# Patient Record
Sex: MALE | Race: WHITE | NOT HISPANIC OR LATINO | Employment: FULL TIME | URBAN - METROPOLITAN AREA
[De-identification: names, ages, dates, MRNs, and addresses within clinical notes are randomized per-mention and may not be internally consistent; named-entity substitution may affect disease eponyms.]

---

## 2022-08-31 ENCOUNTER — OFFICE VISIT (OUTPATIENT)
Dept: INTERNAL MEDICINE | Facility: CLINIC | Age: 32
End: 2022-08-31

## 2022-08-31 VITALS
BODY MASS INDEX: 27.7 KG/M2 | OXYGEN SATURATION: 98 % | SYSTOLIC BLOOD PRESSURE: 140 MMHG | HEART RATE: 116 BPM | DIASTOLIC BLOOD PRESSURE: 80 MMHG | RESPIRATION RATE: 16 BRPM | TEMPERATURE: 100.2 F | HEIGHT: 69 IN | WEIGHT: 187 LBS

## 2022-08-31 DIAGNOSIS — I10 PRIMARY HYPERTENSION: ICD-10-CM

## 2022-08-31 DIAGNOSIS — Z00.00 ROUTINE ADULT HEALTH MAINTENANCE: Primary | ICD-10-CM

## 2022-08-31 PROCEDURE — 99204 OFFICE O/P NEW MOD 45 MIN: CPT | Performed by: NURSE PRACTITIONER

## 2022-08-31 RX ORDER — DEXTROAMPHETAMINE SACCHARATE, AMPHETAMINE ASPARTATE, DEXTROAMPHETAMINE SULFATE AND AMPHETAMINE SULFATE 1.25; 1.25; 1.25; 1.25 MG/1; MG/1; MG/1; MG/1
TABLET ORAL
COMMUNITY
Start: 2022-08-17 | End: 2022-09-28

## 2022-08-31 RX ORDER — METOPROLOL SUCCINATE 50 MG/1
50 TABLET, EXTENDED RELEASE ORAL DAILY
Qty: 30 TABLET | Refills: 1 | Status: SHIPPED | OUTPATIENT
Start: 2022-08-31 | End: 2022-09-28 | Stop reason: SDUPTHER

## 2022-08-31 NOTE — ASSESSMENT & PLAN NOTE
Hypertension is worsening.  Continue current treatment regimen.  Dietary sodium restriction.  Weight loss.  Regular aerobic exercise.  Continue current medications.  Blood pressure will be reassessed in 2-4 weeks.   Patient advised to check BP daily.

## 2022-08-31 NOTE — PROGRESS NOTES
"Chief Complaint  Establish Care (Med refill)    Subjective        Joaquim Solano presents to [facility]  History of Present Illness  This is a 32 y/o male presenting to office for establishment of care. Patient reports last check up 2 months ago. Patient reports he is currently  and has no children. Patient reports working as a .     Patient reports some exercise. Patient reports following healthy diet.     Patient reports weening off of vaping at this time. Patient reports using occasional alcohol.     Patient follows with Dr. Rao for psychiatric needs. Patient reports hx of ADHD-- restarted on adderall back on 8/17/22. Patient continues following with therapist-- Caitlin Milan.     Patient reports having some issues with high blood pressure. Patient reports his BP was elevated at his last appt with psychiatrist. Patient reports previous hx with HTN-- had taken lisinopril but had cough. Patient denies chest pain.     Objective   Vital Signs:  /80 (BP Location: Left arm, Patient Position: Sitting, Cuff Size: Large Adult)   Pulse 116   Temp 100.2 °F (37.9 °C) (Temporal)   Resp 16   Ht 175.3 cm (69\")   Wt 84.8 kg (187 lb)   SpO2 98%   BMI 27.62 kg/m²   Estimated body mass index is 27.62 kg/m² as calculated from the following:    Height as of this encounter: 175.3 cm (69\").    Weight as of this encounter: 84.8 kg (187 lb).          Physical Exam  Constitutional:       Appearance: Normal appearance.   HENT:      Head: Normocephalic and atraumatic.   Eyes:      Extraocular Movements: Extraocular movements intact.      Pupils: Pupils are equal, round, and reactive to light.   Cardiovascular:      Rate and Rhythm: Normal rate and regular rhythm.      Pulses: Normal pulses.      Heart sounds: Normal heart sounds. No murmur heard.    No friction rub. No gallop.   Pulmonary:      Effort: Pulmonary effort is normal. No respiratory distress.      Breath " sounds: No stridor. No wheezing, rhonchi or rales.   Musculoskeletal:         General: Normal range of motion.   Skin:     General: Skin is warm.   Neurological:      General: No focal deficit present.      Mental Status: He is alert and oriented to person, place, and time. Mental status is at baseline.   Psychiatric:         Mood and Affect: Mood normal.         Thought Content: Thought content normal.         Judgment: Judgment normal.        Result Review :                Assessment and Plan   Diagnoses and all orders for this visit:    1. Routine adult health maintenance (Primary)  Assessment & Plan:  Return in 4 weeks for fasting blood panel.   Continue with regular exercise and DASH diet recommendations.         2. Primary hypertension  Assessment & Plan:  Hypertension is worsening.  Continue current treatment regimen.  Dietary sodium restriction.  Weight loss.  Regular aerobic exercise.  Continue current medications.  Blood pressure will be reassessed in 2-4 weeks.   Patient advised to check BP daily.     Orders:  -     metoprolol succinate XL (Toprol XL) 50 MG 24 hr tablet; Take 1 tablet by mouth Daily for 30 days.  Dispense: 30 tablet; Refill: 1         I spent 30 minutes caring for Joaquim on this date of service. This time includes time spent by me in the following activities:preparing for the visit, reviewing tests, obtaining and/or reviewing a separately obtained history, performing a medically appropriate examination and/or evaluation , counseling and educating the patient/family/caregiver, ordering medications, tests, or procedures, documenting information in the medical record and care coordination  Follow Up   Return in about 4 weeks (around 9/28/2022) for Recheck HTN and fasting labs.  Patient was given instructions and counseling regarding his condition or for health maintenance advice. Please see specific information pulled into the AVS if appropriate.

## 2022-08-31 NOTE — ASSESSMENT & PLAN NOTE
Return in 4 weeks for fasting blood panel.   Continue with regular exercise and DASH diet recommendations.

## 2022-09-01 ENCOUNTER — PATIENT MESSAGE (OUTPATIENT)
Dept: INTERNAL MEDICINE | Facility: CLINIC | Age: 32
End: 2022-09-01

## 2022-09-02 NOTE — TELEPHONE ENCOUNTER
From: Joaquim Solano  To: IRIS Nieves  Sent: 9/1/2022 3:41 PM EDT  Subject: Blood Pressure    Lynn,    Thank you for meeting with me. I'm happy to have found a professional and pleasant primary care specialist. Speaking with Dr. Rao he noted he'd like a letter stating my blood pressure was under manageable control since when I was in his office last week it was spiked to 190/120 (which I thought was a bit of a misreading or was misread). This also caused him to rethink the stimulant (for now), but during my first reading at [facility] (140/80) and second reading (150/90), it was no where near what I showed in Dr. MARINELLI's office. Is it too soon to ask for a letter stating my blood pressure is more under control then what was shown in his office that day?    Thank you,  Joaquim Solano

## 2022-09-28 ENCOUNTER — OFFICE VISIT (OUTPATIENT)
Dept: INTERNAL MEDICINE | Facility: CLINIC | Age: 32
End: 2022-09-28

## 2022-09-28 VITALS
SYSTOLIC BLOOD PRESSURE: 170 MMHG | BODY MASS INDEX: 28.58 KG/M2 | TEMPERATURE: 98 F | WEIGHT: 193 LBS | OXYGEN SATURATION: 99 % | HEIGHT: 69 IN | DIASTOLIC BLOOD PRESSURE: 100 MMHG | HEART RATE: 93 BPM | RESPIRATION RATE: 18 BRPM

## 2022-09-28 DIAGNOSIS — I10 PRIMARY HYPERTENSION: ICD-10-CM

## 2022-09-28 DIAGNOSIS — Z00.00 ROUTINE ADULT HEALTH MAINTENANCE: Primary | ICD-10-CM

## 2022-09-28 PROCEDURE — 99213 OFFICE O/P EST LOW 20 MIN: CPT | Performed by: NURSE PRACTITIONER

## 2022-09-28 RX ORDER — DEXTROAMPHETAMINE SACCHARATE, AMPHETAMINE ASPARTATE, DEXTROAMPHETAMINE SULFATE AND AMPHETAMINE SULFATE 2.5; 2.5; 2.5; 2.5 MG/1; MG/1; MG/1; MG/1
TABLET ORAL
COMMUNITY
Start: 2022-09-07 | End: 2023-03-28

## 2022-09-28 RX ORDER — METOPROLOL SUCCINATE 50 MG/1
50 TABLET, EXTENDED RELEASE ORAL DAILY
Qty: 90 TABLET | Refills: 0 | Status: SHIPPED | OUTPATIENT
Start: 2022-09-28 | End: 2023-03-28 | Stop reason: SDUPTHER

## 2022-09-28 NOTE — PROGRESS NOTES
"Chief Complaint  Follow-up and Hypertension (Pt hasn't taking medicine in 3 days haven't ate today and had an energy drink and coffee this morning )    Subjective        Joaquim Solano presents to [facility]  History of Present Illness  This is a 32 y/o male presenting to office for f/u with HTN. Patient reports he has not taken any of his metoprolol the past 3 days. Patient reports that previously he had been checking his BP at home on metoprolol-- average readings 120/80's. Denies any current chest pain or heart palpitations. Patient is also here to get lab work completed.     Objective   Vital Signs:  /100 (BP Location: Left arm, Patient Position: Sitting, Cuff Size: Adult)   Pulse 93   Temp 98 °F (36.7 °C)   Resp 18   Ht 175.3 cm (69\")   Wt 87.5 kg (193 lb)   SpO2 99%   BMI 28.50 kg/m²   Estimated body mass index is 28.5 kg/m² as calculated from the following:    Height as of this encounter: 175.3 cm (69\").    Weight as of this encounter: 87.5 kg (193 lb).          Physical Exam   Result Review :  The following data was reviewed by: IRIS Nieves on 09/28/2022:             Assessment and Plan   Diagnoses and all orders for this visit:    1. Routine adult health maintenance (Primary)  -     CBC & Differential  -     Comprehensive metabolic panel  -     TSH Rfx On Abnormal To Free T4  -     Hemoglobin A1c  -     Lipid panel    2. Primary hypertension  Assessment & Plan:  Advised to take medication once he goes home.   Advised to send me BP readings for the week.   Advised to make sure to take blood pressure medication daily.       Orders:  -     metoprolol succinate XL (Toprol XL) 50 MG 24 hr tablet; Take 1 tablet by mouth Daily for 30 days.  Dispense: 90 tablet; Refill: 0  -     CBC & Differential  -     Comprehensive metabolic panel           Follow Up   Return in about 6 months (around 3/28/2023) for Recheck HTN.  Patient was given instructions and counseling " regarding his condition or for health maintenance advice. Please see specific information pulled into the AVS if appropriate.

## 2022-09-28 NOTE — ASSESSMENT & PLAN NOTE
Advised to take medication once he goes home.   Advised to send me BP readings for the week.   Advised to make sure to take blood pressure medication daily.

## 2022-09-29 LAB
ALBUMIN SERPL-MCNC: 4.9 G/DL (ref 3.5–5.2)
ALBUMIN/GLOB SERPL: 2 G/DL
ALP SERPL-CCNC: 73 U/L (ref 39–117)
ALT SERPL-CCNC: 31 U/L (ref 1–41)
AST SERPL-CCNC: 22 U/L (ref 1–40)
BASOPHILS # BLD AUTO: 0.04 10*3/MM3 (ref 0–0.2)
BASOPHILS NFR BLD AUTO: 0.4 % (ref 0–1.5)
BILIRUB SERPL-MCNC: 0.6 MG/DL (ref 0–1.2)
BUN SERPL-MCNC: 13 MG/DL (ref 6–20)
BUN/CREAT SERPL: 14.6 (ref 7–25)
CALCIUM SERPL-MCNC: 10 MG/DL (ref 8.6–10.5)
CHLORIDE SERPL-SCNC: 98 MMOL/L (ref 98–107)
CHOLEST SERPL-MCNC: 203 MG/DL (ref 0–200)
CO2 SERPL-SCNC: 27 MMOL/L (ref 22–29)
CREAT SERPL-MCNC: 0.89 MG/DL (ref 0.76–1.27)
EGFRCR SERPLBLD CKD-EPI 2021: 116 ML/MIN/1.73
EOSINOPHIL # BLD AUTO: 0.11 10*3/MM3 (ref 0–0.4)
EOSINOPHIL NFR BLD AUTO: 1 % (ref 0.3–6.2)
ERYTHROCYTE [DISTWIDTH] IN BLOOD BY AUTOMATED COUNT: 12.4 % (ref 12.3–15.4)
GLOBULIN SER CALC-MCNC: 2.4 GM/DL
GLUCOSE SERPL-MCNC: 97 MG/DL (ref 65–99)
HBA1C MFR BLD: 5.2 % (ref 4.8–5.6)
HCT VFR BLD AUTO: 45.2 % (ref 37.5–51)
HDLC SERPL-MCNC: 84 MG/DL (ref 40–60)
HGB BLD-MCNC: 15.8 G/DL (ref 13–17.7)
IMM GRANULOCYTES # BLD AUTO: 0.06 10*3/MM3 (ref 0–0.05)
IMM GRANULOCYTES NFR BLD AUTO: 0.6 % (ref 0–0.5)
LDLC SERPL CALC-MCNC: 107 MG/DL (ref 0–100)
LYMPHOCYTES # BLD AUTO: 2.91 10*3/MM3 (ref 0.7–3.1)
LYMPHOCYTES NFR BLD AUTO: 27.5 % (ref 19.6–45.3)
MCH RBC QN AUTO: 32.8 PG (ref 26.6–33)
MCHC RBC AUTO-ENTMCNC: 35 G/DL (ref 31.5–35.7)
MCV RBC AUTO: 94 FL (ref 79–97)
MONOCYTES # BLD AUTO: 0.69 10*3/MM3 (ref 0.1–0.9)
MONOCYTES NFR BLD AUTO: 6.5 % (ref 5–12)
NEUTROPHILS # BLD AUTO: 6.79 10*3/MM3 (ref 1.7–7)
NEUTROPHILS NFR BLD AUTO: 64 % (ref 42.7–76)
NRBC BLD AUTO-RTO: 0 /100 WBC (ref 0–0.2)
PLATELET # BLD AUTO: 217 10*3/MM3 (ref 140–450)
POTASSIUM SERPL-SCNC: 4.2 MMOL/L (ref 3.5–5.2)
PROT SERPL-MCNC: 7.3 G/DL (ref 6–8.5)
RBC # BLD AUTO: 4.81 10*6/MM3 (ref 4.14–5.8)
SODIUM SERPL-SCNC: 138 MMOL/L (ref 136–145)
TRIGL SERPL-MCNC: 64 MG/DL (ref 0–150)
TSH SERPL DL<=0.005 MIU/L-ACNC: 1.26 UIU/ML (ref 0.27–4.2)
VLDLC SERPL CALC-MCNC: 12 MG/DL (ref 5–40)
WBC # BLD AUTO: 10.6 10*3/MM3 (ref 3.4–10.8)

## 2022-09-29 NOTE — PROGRESS NOTES
Please let patient know-- labs stable; LDL slightly elevated; recommend low refined carb/low sat fat diet and exercise.     F/u as scheduled.

## 2023-03-28 ENCOUNTER — OFFICE VISIT (OUTPATIENT)
Dept: INTERNAL MEDICINE | Facility: CLINIC | Age: 33
End: 2023-03-28
Payer: COMMERCIAL

## 2023-03-28 VITALS
DIASTOLIC BLOOD PRESSURE: 80 MMHG | BODY MASS INDEX: 33.03 KG/M2 | WEIGHT: 223 LBS | HEART RATE: 113 BPM | OXYGEN SATURATION: 99 % | TEMPERATURE: 98.7 F | HEIGHT: 69 IN | SYSTOLIC BLOOD PRESSURE: 138 MMHG

## 2023-03-28 DIAGNOSIS — S49.92XA INJURY OF LEFT SHOULDER, INITIAL ENCOUNTER: Chronic | ICD-10-CM

## 2023-03-28 DIAGNOSIS — M62.838 MUSCLE SPASM OF RIGHT LEG: ICD-10-CM

## 2023-03-28 DIAGNOSIS — I10 PRIMARY HYPERTENSION: Primary | ICD-10-CM

## 2023-03-28 RX ORDER — METOPROLOL SUCCINATE 50 MG/1
50 TABLET, EXTENDED RELEASE ORAL DAILY
Qty: 90 TABLET | Refills: 2 | Status: SHIPPED | OUTPATIENT
Start: 2023-03-28 | End: 2023-04-27

## 2023-03-28 RX ORDER — DEXTROAMPHETAMINE SACCHARATE, AMPHETAMINE ASPARTATE, DEXTROAMPHETAMINE SULFATE AND AMPHETAMINE SULFATE 3.75; 3.75; 3.75; 3.75 MG/1; MG/1; MG/1; MG/1
TABLET ORAL
COMMUNITY
Start: 2023-03-13

## 2023-03-28 NOTE — PROGRESS NOTES
"Chief Complaint  Hypertension (recheck)    Subjective        Joaquim Solano presents to [facility]  History of Present Illness  This is a 31 y/o male presenting to office for f/u with HTN. Patient has been out of metoprolol over the past few weeks. Reports checking BP at home-- avg 130's/80's. Patient continues with diet modification. Needs refill today. Denies any CP.     Patient reports recently \"tweaking\" shoulder at gym a few months ago-- reports he had to pop his shoulder back in himself. Patient reports he has been experiencing numbness in his posterior shoulder. Denies any pain. Reports he has not tried to do specific exercises with it.     Patient reports he has noticed that he has had some tingling to his right leg. Patient denies any back pain. Reports he notices the tingling from the right hip down to his foot. Patient reports he tried running 3 weeks ago-- reports he started experiencing this issue as well.     Objective   Vital Signs:  /80 (BP Location: Left arm, Patient Position: Sitting, Cuff Size: Adult)   Pulse 113   Temp 98.7 °F (37.1 °C) (Infrared)   Ht 175.3 cm (69\")   Wt 101 kg (223 lb)   SpO2 99%   BMI 32.93 kg/m²   Estimated body mass index is 32.93 kg/m² as calculated from the following:    Height as of this encounter: 175.3 cm (69\").    Weight as of this encounter: 101 kg (223 lb).       BMI is >= 30 and <35. (Class 1 Obesity). The following options were offered after discussion;: exercise counseling/recommendations and nutrition counseling/recommendations      Physical Exam  Constitutional:       Appearance: Normal appearance.   HENT:      Head: Normocephalic and atraumatic.      Right Ear: External ear normal.      Left Ear: External ear normal.   Eyes:      Conjunctiva/sclera: Conjunctivae normal.      Pupils: Pupils are equal, round, and reactive to light.   Cardiovascular:      Rate and Rhythm: Normal rate and regular rhythm.      Pulses: Normal " pulses.      Heart sounds: Normal heart sounds. No murmur heard.    No gallop.   Pulmonary:      Effort: Pulmonary effort is normal. No respiratory distress.      Breath sounds: Normal breath sounds. No stridor. No wheezing, rhonchi or rales.   Musculoskeletal:         General: Tenderness and signs of injury present.      Left shoulder: Decreased range of motion. Decreased strength.      Cervical back: Normal range of motion and neck supple.      Right upper leg: Tenderness present.      Right lower leg: Tenderness present.   Skin:     General: Skin is warm and dry.   Neurological:      General: No focal deficit present.      Mental Status: He is alert and oriented to person, place, and time. Mental status is at baseline.   Psychiatric:         Mood and Affect: Mood normal.         Thought Content: Thought content normal.         Judgment: Judgment normal.        Result Review :  The following data was reviewed by: IRIS Nieves on 03/28/2023:  Common labs    Common Labs 9/28/22 9/28/22 9/28/22 9/28/22    1621 1621 1621 1621   Glucose  97     BUN  13     Creatinine  0.89     Sodium  138     Potassium  4.2     Chloride  98     Calcium  10.0     Total Protein  7.3     Albumin  4.90     Total Bilirubin  0.6     Alkaline Phosphatase  73     AST (SGOT)  22     ALT (SGPT)  31     WBC 10.60      Hemoglobin 15.8      Hematocrit 45.2      Platelets 217      Total Cholesterol    203 (A)   Triglycerides    64   HDL Cholesterol    84 (A)   LDL Cholesterol     107 (A)   Hemoglobin A1C   5.20    (A) Abnormal value       Comments are available for some flowsheets but are not being displayed.                        Assessment and Plan   Diagnoses and all orders for this visit:    1. Primary hypertension (Primary)  Assessment & Plan:  Hypertension is unchanged.  Continue current treatment regimen.  Dietary sodium restriction.  Weight loss.  Regular aerobic exercise.  Continue current medications.  Blood pressure will be  reassessed at the next regular appointment.    Orders:  -     metoprolol succinate XL (Toprol XL) 50 MG 24 hr tablet; Take 1 tablet by mouth Daily for 30 days.  Dispense: 90 tablet; Refill: 2    2. Injury of left shoulder, initial encounter  Assessment & Plan:  Recommended to avoid use until cleared by orthopedics due to ongoing numbness.     Orders:  -     Cancel: Ambulatory Referral to Orthopedic Surgery  -     Ambulatory Referral to Orthopedic Surgery    3. Muscle spasm of right leg  Assessment & Plan:  Recommended ITB stretches.   Ice/heat per patient preference.  Tylenol/ibuprofen PRN.     Orders:  -     Ambulatory Referral to Orthopedic Surgery           Follow Up   Return in about 6 months (around 9/28/2023) for Annual physical.  Patient was given instructions and counseling regarding his condition or for health maintenance advice. Please see specific information pulled into the AVS if appropriate.

## 2023-03-29 ENCOUNTER — TELEPHONE (OUTPATIENT)
Dept: ORTHOPEDIC SURGERY | Facility: CLINIC | Age: 33
End: 2023-03-29
Payer: COMMERCIAL

## 2023-03-29 NOTE — TELEPHONE ENCOUNTER
DX Injury of left shoulder, Muscle spasm of right leg - PER REF NOTES Left posterior shoulder numbness; recent dislocation-- placed it back himself; reports constant numbness; mild pressure, Possible ITB syndrome as well; experiencing some muscle spasms to right leg - IS THIS SOMETHING OSITO WILL SEE?

## 2023-04-03 NOTE — TELEPHONE ENCOUNTER
I can see him. Would focus the visit on more symptomatic area first- probably would recommend shoulder if concern for recent injury and dislocation.

## 2023-07-26 ENCOUNTER — TELEPHONE (OUTPATIENT)
Dept: INTERNAL MEDICINE | Facility: CLINIC | Age: 33
End: 2023-07-26
Payer: COMMERCIAL

## 2023-10-16 ENCOUNTER — OFFICE VISIT (OUTPATIENT)
Dept: INTERNAL MEDICINE | Facility: CLINIC | Age: 33
End: 2023-10-16
Payer: COMMERCIAL

## 2023-10-16 VITALS
OXYGEN SATURATION: 99 % | DIASTOLIC BLOOD PRESSURE: 90 MMHG | BODY MASS INDEX: 27.4 KG/M2 | SYSTOLIC BLOOD PRESSURE: 150 MMHG | HEART RATE: 79 BPM | WEIGHT: 185 LBS | HEIGHT: 69 IN

## 2023-10-16 DIAGNOSIS — Z00.00 ANNUAL PHYSICAL EXAM: Primary | ICD-10-CM

## 2023-10-16 DIAGNOSIS — I10 PRIMARY HYPERTENSION: Chronic | ICD-10-CM

## 2023-10-16 DIAGNOSIS — Z23 ENCOUNTER FOR IMMUNIZATION: ICD-10-CM

## 2023-10-16 PROCEDURE — 99214 OFFICE O/P EST MOD 30 MIN: CPT | Performed by: NURSE PRACTITIONER

## 2023-10-16 PROCEDURE — 99395 PREV VISIT EST AGE 18-39: CPT | Performed by: NURSE PRACTITIONER

## 2023-10-16 PROCEDURE — 90715 TDAP VACCINE 7 YRS/> IM: CPT | Performed by: NURSE PRACTITIONER

## 2023-10-16 PROCEDURE — 90471 IMMUNIZATION ADMIN: CPT | Performed by: NURSE PRACTITIONER

## 2023-10-16 RX ORDER — AMLODIPINE AND OLMESARTAN MEDOXOMIL 5; 20 MG/1; MG/1
1 TABLET ORAL DAILY
Qty: 30 TABLET | Refills: 0 | Status: SHIPPED | OUTPATIENT
Start: 2023-10-16

## 2023-10-16 NOTE — ASSESSMENT & PLAN NOTE
Recommended 150-300 minutes weekly exercise  Continue with healthy diet choices  Labs will be performed next visit in 2 weeks due to needing to fast  Recommended monthly self testicular examinations  Anticipatory guidance given regarding health prevention/wellness, diet/exercise, tobacco/alcohol/drug education, exercise and wellbeing, covid 19 guidance, vaccination recommendations, and sexual health/STD education.   Recommended bi-yearly dental exams and regular vision examinations.

## 2023-10-16 NOTE — PROGRESS NOTES
"Chief Complaint  Annual Exam    Subjective        Joaquim Solano presents to [facility]  History of Present Illness  This is a 33 y/o male presenting to office for annual exam.     Reports exercising regularly; following healthy diet choices.     Reports rare alcohol use. Denies tobacco use.     HTN-- reports being out of metoprolol. Reports he has been having elevated BP readings; reports checking BP at home; reports avg 150/90's. Denies CP.     Following with Dr. Rao for psychiatry needs; continues on adderall for ADHD needs.     Due for dental exam.      Objective   Vital Signs:  /90 (BP Location: Left arm, Patient Position: Sitting, Cuff Size: Adult)   Pulse 79   Ht 175.3 cm (69\")   Wt 83.9 kg (185 lb)   SpO2 99%   BMI 27.32 kg/m²   Estimated body mass index is 27.32 kg/m² as calculated from the following:    Height as of this encounter: 175.3 cm (69\").    Weight as of this encounter: 83.9 kg (185 lb).               Physical Exam  Constitutional:       Appearance: Normal appearance.   HENT:      Head: Normocephalic and atraumatic.      Right Ear: Tympanic membrane, ear canal and external ear normal.      Left Ear: Tympanic membrane, ear canal and external ear normal.      Nose: Nose normal.      Mouth/Throat:      Mouth: Mucous membranes are moist.      Pharynx: Oropharynx is clear.   Eyes:      Conjunctiva/sclera: Conjunctivae normal.      Pupils: Pupils are equal, round, and reactive to light.   Neck:      Vascular: No carotid bruit.   Cardiovascular:      Rate and Rhythm: Normal rate and regular rhythm.      Pulses: Normal pulses.      Heart sounds: Normal heart sounds. No murmur heard.     No gallop.   Pulmonary:      Effort: Pulmonary effort is normal. No respiratory distress.      Breath sounds: Normal breath sounds. No stridor. No wheezing, rhonchi or rales.   Abdominal:      General: Bowel sounds are normal. There is no distension.      Palpations: Abdomen is " soft. There is no mass.      Tenderness: There is no abdominal tenderness. There is no guarding.      Hernia: No hernia is present.   Musculoskeletal:         General: Normal range of motion.      Cervical back: Normal range of motion and neck supple.   Skin:     General: Skin is warm and dry.   Neurological:      General: No focal deficit present.      Mental Status: He is alert and oriented to person, place, and time. Mental status is at baseline.   Psychiatric:         Mood and Affect: Mood normal.         Thought Content: Thought content normal.         Judgment: Judgment normal.        Result Review :  The following data was reviewed by: IRIS Nieves on 10/16/2023:                 Assessment and Plan   Diagnoses and all orders for this visit:    1. Annual physical exam (Primary)  Assessment & Plan:  Recommended 150-300 minutes weekly exercise  Continue with healthy diet choices  Labs will be performed next visit in 2 weeks due to needing to fast  Recommended monthly self testicular examinations  Anticipatory guidance given regarding health prevention/wellness, diet/exercise, tobacco/alcohol/drug education, exercise and wellbeing, covid 19 guidance, vaccination recommendations, and sexual health/STD education.   Recommended bi-yearly dental exams and regular vision examinations.         2. Primary hypertension  Assessment & Plan:  Hypertension is worsening.  Dietary sodium restriction.  Weight loss.  Regular aerobic exercise.  Medication changes per orders.  Blood pressure will be reassessed in 2 weeks.  Stop metoprolol  Start amlodipine/olmesartan  AHA hand out provided  BP goal 120/80  Recommended checking BP twice daily and keeping log  Return to office in 2 weeks with home blood pressure monitoring system     Orders:  -     amlodipine-olmesartan (KIANA) 5-20 MG per tablet; Take 1 tablet by mouth Daily.  Dispense: 30 tablet; Refill: 0    3. Encounter for immunization  -     Tdap Vaccine => 8yo IM  (BOOSTRIX)             Follow Up   Return in about 2 weeks (around 10/30/2023) for Recheck HTN.  Patient was given instructions and counseling regarding his condition or for health maintenance advice. Please see specific information pulled into the AVS if appropriate.

## 2023-10-16 NOTE — ASSESSMENT & PLAN NOTE
Hypertension is worsening.  Dietary sodium restriction.  Weight loss.  Regular aerobic exercise.  Medication changes per orders.  Blood pressure will be reassessed in 2 weeks.  Stop metoprolol  Start amlodipine/olmesartan  AHA hand out provided  BP goal 120/80  Recommended checking BP twice daily and keeping log  Return to office in 2 weeks with home blood pressure monitoring system

## 2023-11-03 ENCOUNTER — OFFICE VISIT (OUTPATIENT)
Dept: INTERNAL MEDICINE | Facility: CLINIC | Age: 33
End: 2023-11-03
Payer: COMMERCIAL

## 2023-11-03 VITALS
HEIGHT: 69 IN | BODY MASS INDEX: 27.85 KG/M2 | SYSTOLIC BLOOD PRESSURE: 120 MMHG | OXYGEN SATURATION: 99 % | HEART RATE: 92 BPM | WEIGHT: 188 LBS | DIASTOLIC BLOOD PRESSURE: 85 MMHG

## 2023-11-03 DIAGNOSIS — Z00.00 ROUTINE ADULT HEALTH MAINTENANCE: ICD-10-CM

## 2023-11-03 DIAGNOSIS — S49.92XD INJURY OF LEFT SHOULDER, SUBSEQUENT ENCOUNTER: Chronic | ICD-10-CM

## 2023-11-03 DIAGNOSIS — I10 PRIMARY HYPERTENSION: Primary | ICD-10-CM

## 2023-11-03 LAB
ALBUMIN SERPL-MCNC: 5.2 G/DL (ref 3.5–5.2)
ALBUMIN/GLOB SERPL: 1.9 G/DL
ALP SERPL-CCNC: 79 U/L (ref 39–117)
ALT SERPL-CCNC: 25 U/L (ref 1–41)
AST SERPL-CCNC: 18 U/L (ref 1–40)
BASOPHILS # BLD AUTO: 0.05 10*3/MM3 (ref 0–0.2)
BASOPHILS NFR BLD AUTO: 0.6 % (ref 0–1.5)
BILIRUB SERPL-MCNC: 0.6 MG/DL (ref 0–1.2)
BUN SERPL-MCNC: 14 MG/DL (ref 6–20)
BUN/CREAT SERPL: 16.1 (ref 7–25)
CALCIUM SERPL-MCNC: 10.5 MG/DL (ref 8.6–10.5)
CHLORIDE SERPL-SCNC: 102 MMOL/L (ref 98–107)
CHOLEST SERPL-MCNC: 158 MG/DL (ref 0–200)
CO2 SERPL-SCNC: 29.5 MMOL/L (ref 22–29)
CREAT SERPL-MCNC: 0.87 MG/DL (ref 0.76–1.27)
EGFRCR SERPLBLD CKD-EPI 2021: 115.4 ML/MIN/1.73
EOSINOPHIL # BLD AUTO: 0.24 10*3/MM3 (ref 0–0.4)
EOSINOPHIL NFR BLD AUTO: 2.8 % (ref 0.3–6.2)
ERYTHROCYTE [DISTWIDTH] IN BLOOD BY AUTOMATED COUNT: 11.8 % (ref 12.3–15.4)
GLOBULIN SER CALC-MCNC: 2.7 GM/DL
GLUCOSE SERPL-MCNC: 82 MG/DL (ref 65–99)
HBA1C MFR BLD: 5.1 % (ref 4.8–5.6)
HCT VFR BLD AUTO: 47.9 % (ref 37.5–51)
HDLC SERPL-MCNC: 55 MG/DL (ref 40–60)
HGB BLD-MCNC: 16.8 G/DL (ref 13–17.7)
IMM GRANULOCYTES # BLD AUTO: 0.02 10*3/MM3 (ref 0–0.05)
IMM GRANULOCYTES NFR BLD AUTO: 0.2 % (ref 0–0.5)
LDLC SERPL CALC-MCNC: 89 MG/DL (ref 0–100)
LYMPHOCYTES # BLD AUTO: 2.22 10*3/MM3 (ref 0.7–3.1)
LYMPHOCYTES NFR BLD AUTO: 26 % (ref 19.6–45.3)
MCH RBC QN AUTO: 32.5 PG (ref 26.6–33)
MCHC RBC AUTO-ENTMCNC: 35.1 G/DL (ref 31.5–35.7)
MCV RBC AUTO: 92.6 FL (ref 79–97)
MONOCYTES # BLD AUTO: 0.7 10*3/MM3 (ref 0.1–0.9)
MONOCYTES NFR BLD AUTO: 8.2 % (ref 5–12)
NEUTROPHILS # BLD AUTO: 5.3 10*3/MM3 (ref 1.7–7)
NEUTROPHILS NFR BLD AUTO: 62.2 % (ref 42.7–76)
NRBC BLD AUTO-RTO: 0 /100 WBC (ref 0–0.2)
PLATELET # BLD AUTO: 240 10*3/MM3 (ref 140–450)
POTASSIUM SERPL-SCNC: 5.2 MMOL/L (ref 3.5–5.2)
PROT SERPL-MCNC: 7.9 G/DL (ref 6–8.5)
RBC # BLD AUTO: 5.17 10*6/MM3 (ref 4.14–5.8)
SODIUM SERPL-SCNC: 142 MMOL/L (ref 136–145)
TRIGL SERPL-MCNC: 73 MG/DL (ref 0–150)
TSH SERPL DL<=0.005 MIU/L-ACNC: 1.48 UIU/ML (ref 0.27–4.2)
VLDLC SERPL CALC-MCNC: 14 MG/DL (ref 5–40)
WBC # BLD AUTO: 8.53 10*3/MM3 (ref 3.4–10.8)

## 2023-11-03 RX ORDER — AMLODIPINE AND OLMESARTAN MEDOXOMIL 5; 20 MG/1; MG/1
1 TABLET ORAL DAILY
Qty: 90 TABLET | Refills: 1 | Status: SHIPPED | OUTPATIENT
Start: 2023-11-03

## 2023-11-03 RX ORDER — TIRZEPATIDE 2.5 MG/.5ML
INJECTION, SOLUTION SUBCUTANEOUS
COMMUNITY
Start: 2023-10-25

## 2023-11-03 NOTE — PROGRESS NOTES
"Chief Complaint  Hypertension    Subjective        Joaquim Solano presents to [facility]  This is a 34 y/o male presenting to office for f/u with HTN and complaints of left arm \"shaking.\"   Reports he has noted that he has had some issues with his left shoulder. Reports about 1.5 years ago, he had injured his shoulder when doing overhead press with dumbbells. Reports his shoulder had \"popped out of place.\" Reports he attempted to slam this against the wall to get this back in place. Reports he has continued numbness in his posterior shoulder. Reports he has noted in certain positions, his arm will start shaking. Reports some numbness and tingling. Denies any driving pain.     Objective   Vital Signs:  /85 (BP Location: Left arm, Patient Position: Sitting, Cuff Size: Adult)   Pulse 92   Ht 175.3 cm (69\")   Wt 85.3 kg (188 lb)   SpO2 99%   BMI 27.76 kg/m²   Estimated body mass index is 27.76 kg/m² as calculated from the following:    Height as of this encounter: 175.3 cm (69\").    Weight as of this encounter: 85.3 kg (188 lb).               Physical Exam  Constitutional:       Appearance: Normal appearance.   HENT:      Head: Normocephalic and atraumatic.   Eyes:      Conjunctiva/sclera: Conjunctivae normal.      Pupils: Pupils are equal, round, and reactive to light.   Cardiovascular:      Rate and Rhythm: Normal rate and regular rhythm.      Pulses: Normal pulses.      Heart sounds: Normal heart sounds. No murmur heard.     No friction rub. No gallop.   Pulmonary:      Effort: Pulmonary effort is normal. No respiratory distress.      Breath sounds: Normal breath sounds. No stridor. No wheezing, rhonchi or rales.   Musculoskeletal:         General: Signs of injury present. No swelling or deformity.        Arms:    Skin:     General: Skin is warm and dry.   Neurological:      General: No focal deficit present.      Mental Status: He is alert and oriented to person, place, and " time. Mental status is at baseline.   Psychiatric:         Mood and Affect: Mood normal.         Thought Content: Thought content normal.         Judgment: Judgment normal.        Result Review :  The following data was reviewed by: IRIS Nieves on 11/03/2023:                 Assessment and Plan   Diagnoses and all orders for this visit:    1. Primary hypertension (Primary)  Assessment & Plan:  Hypertension is improving with treatment.  Continue current treatment regimen.  Dietary sodium restriction.  Weight loss.  Regular aerobic exercise.  Continue current medications.  Blood pressure will be reassessed at the next regular appointment.    Orders:  -     CBC & Differential  -     Comprehensive metabolic panel  -     amlodipine-olmesartan (KIANA) 5-20 MG per tablet; Take 1 tablet by mouth Daily.  Dispense: 90 tablet; Refill: 1    2. Injury of left shoulder, subsequent encounter  Assessment & Plan:  Recommend avoiding exercises or movements that exacerbate symptoms.   Will refer to orthopedics for further evaluation     Orders:  -     Ambulatory Referral to Orthopedic Surgery    3. Routine adult health maintenance  -     TSH Rfx On Abnormal To Free T4  -     Hemoglobin A1c  -     Lipid panel             Follow Up   Return in about 6 months (around 5/3/2024) for Recheck HTN.  Patient was given instructions and counseling regarding his condition or for health maintenance advice. Please see specific information pulled into the AVS if appropriate.

## 2023-11-03 NOTE — ASSESSMENT & PLAN NOTE
Recommend avoiding exercises or movements that exacerbate symptoms.   Will refer to orthopedics for further evaluation

## 2024-05-03 ENCOUNTER — OFFICE VISIT (OUTPATIENT)
Dept: INTERNAL MEDICINE | Facility: CLINIC | Age: 34
End: 2024-05-03
Payer: COMMERCIAL

## 2024-05-03 VITALS
SYSTOLIC BLOOD PRESSURE: 130 MMHG | HEIGHT: 69 IN | DIASTOLIC BLOOD PRESSURE: 80 MMHG | OXYGEN SATURATION: 98 % | HEART RATE: 105 BPM | BODY MASS INDEX: 26.51 KG/M2 | WEIGHT: 179 LBS

## 2024-05-03 DIAGNOSIS — F51.01 PRIMARY INSOMNIA: Chronic | ICD-10-CM

## 2024-05-03 DIAGNOSIS — I10 PRIMARY HYPERTENSION: Primary | Chronic | ICD-10-CM

## 2024-05-03 DIAGNOSIS — E66.3 OVERWEIGHT WITH BODY MASS INDEX (BMI) OF 26 TO 26.9 IN ADULT: ICD-10-CM

## 2024-05-03 DIAGNOSIS — F90.2 ATTENTION DEFICIT HYPERACTIVITY DISORDER (ADHD), COMBINED TYPE: Chronic | ICD-10-CM

## 2024-05-03 PROCEDURE — 99214 OFFICE O/P EST MOD 30 MIN: CPT | Performed by: NURSE PRACTITIONER

## 2024-05-03 RX ORDER — TRAZODONE HYDROCHLORIDE 50 MG/1
50 TABLET ORAL
COMMUNITY
Start: 2024-04-01

## 2024-05-03 RX ORDER — AMLODIPINE AND OLMESARTAN MEDOXOMIL 5; 20 MG/1; MG/1
1 TABLET ORAL DAILY
Qty: 90 TABLET | Refills: 1 | Status: SHIPPED | OUTPATIENT
Start: 2024-05-03

## 2024-05-03 NOTE — PROGRESS NOTES
"Chief Complaint  Hypertension and Insomnia    Subjective        Joaquim Solano presents to [facility]  History of Present Illness  This is a 34 y/o male presenting to office for f/u with insomnia and HTN.     HTN-- continues on haroon; tolerating medication; reports checking BP at home-- avg 120-130's/80's; denies CP or SOA.     Continues following with behavioral health-- continues on adderall for ADHD; trazodone QHS for sleep aid. Denies any issues. Denies any SI.     Objective   Vital Signs:  /80 (BP Location: Left arm, Patient Position: Sitting, Cuff Size: Adult)   Pulse 105   Ht 175.3 cm (69\")   Wt 81.2 kg (179 lb)   SpO2 98%   BMI 26.43 kg/m²   Estimated body mass index is 26.43 kg/m² as calculated from the following:    Height as of this encounter: 175.3 cm (69\").    Weight as of this encounter: 81.2 kg (179 lb).       BMI is >= 25 and <30. (Overweight) The following options were offered after discussion;: exercise counseling/recommendations and nutrition counseling/recommendations      Physical Exam  Constitutional:       General: He is awake.      Appearance: Normal appearance.   HENT:      Head: Normocephalic and atraumatic.      Right Ear: Hearing and external ear normal.      Left Ear: Hearing and external ear normal.      Nose: Nose normal.      Mouth/Throat:      Lips: Pink.      Mouth: Mucous membranes are moist.      Pharynx: Oropharynx is clear.   Eyes:      Extraocular Movements: Extraocular movements intact.      Pupils: Pupils are equal, round, and reactive to light.   Cardiovascular:      Rate and Rhythm: Normal rate and regular rhythm.      Pulses: Normal pulses.      Heart sounds: Normal heart sounds. No murmur heard.     No gallop.   Pulmonary:      Effort: Pulmonary effort is normal. No respiratory distress.      Breath sounds: Normal breath sounds. No stridor. No wheezing, rhonchi or rales.   Musculoskeletal:      Cervical back: Normal range of motion " and neck supple.   Skin:     General: Skin is warm and dry.      Capillary Refill: Capillary refill takes less than 2 seconds.   Neurological:      General: No focal deficit present.      Mental Status: He is alert and oriented to person, place, and time. Mental status is at baseline.      Motor: Motor function is intact.      Coordination: Coordination is intact.      Gait: Gait is intact.      Deep Tendon Reflexes: Reflexes are normal and symmetric.   Psychiatric:         Attention and Perception: Attention normal.         Mood and Affect: Mood normal.         Speech: Speech normal.         Behavior: Behavior normal. Behavior is cooperative.         Thought Content: Thought content normal.         Cognition and Memory: Cognition normal.         Judgment: Judgment normal.        Result Review :    The following data was reviewed by: IRIS Nieves on 05/03/2024:  Common labs          11/3/2023    08:12   Common Labs   Glucose 82    BUN 14    Creatinine 0.87    Sodium 142    Potassium 5.2    Chloride 102    Calcium 10.5    Total Protein 7.9    Albumin 5.2    Total Bilirubin 0.6    Alkaline Phosphatase 79    AST (SGOT) 18    ALT (SGPT) 25    WBC 8.53    Hemoglobin 16.8    Hematocrit 47.9    Platelets 240    Total Cholesterol 158    Triglycerides 73    HDL Cholesterol 55    LDL Cholesterol  89    Hemoglobin A1C 5.10      Tobacco Use: High Risk (5/3/2024)    Patient History     Smoking Tobacco Use: Never     Smokeless Tobacco Use: Current     Passive Exposure: Not on file     Social History     Substance and Sexual Activity   Alcohol Use Not Currently    Comment: On the occasion there is an art/street fair     Family History   Problem Relation Age of Onset    Cancer Mother         Non-Hodgkins Lymphoma    Hyperlipidemia Mother     Hyperlipidemia Father                   Assessment and Plan     Diagnoses and all orders for this visit:    1. Primary hypertension (Primary)  Assessment & Plan:  Hypertension is stable  and controlled  Continue current treatment regimen.  Dietary sodium restriction.  Regular aerobic exercise.  Blood pressure will be reassessed in 6 months.    Orders:  -     amlodipine-olmesartan (KIANA) 5-20 MG per tablet; Take 1 tablet by mouth Daily.  Dispense: 90 tablet; Refill: 1    2. Attention deficit hyperactivity disorder (ADHD), combined type  Assessment & Plan:  Psychological condition is improving with treatment.  Continue current treatment regimen.  Psychological condition  will be reassessed at the next regular appointment.  Continues on adderall per Dr. Rao with psychiatry       3. Primary insomnia  Assessment & Plan:  Stable on trazodone 25-50mg QHS PRN  Prescribed per Dr. Rao with psychiatry      4. Overweight with body mass index (BMI) of 26 to 26.9 in adult  Assessment & Plan:  BMI is >= 25 and <30. (Overweight) The following options were offered after discussion;: exercise counseling/recommendations and nutrition counseling/recommendations  Improving-- continues with diet and exercise               Follow Up     Return in about 6 months (around 11/3/2024) for Annual physical.  Patient was given instructions and counseling regarding his condition or for health maintenance advice. Please see specific information pulled into the AVS if appropriate.         Answers submitted by the patient for this visit:  Primary Reason for Visit (Submitted on 5/3/2024)  What is the primary reason for your visit?: High Blood Pressure

## 2024-05-03 NOTE — ASSESSMENT & PLAN NOTE
Psychological condition is improving with treatment.  Continue current treatment regimen.  Psychological condition  will be reassessed at the next regular appointment.  Continues on adderall per Dr. Rao with psychiatry

## 2024-05-03 NOTE — ASSESSMENT & PLAN NOTE
BMI is >= 25 and <30. (Overweight) The following options were offered after discussion;: exercise counseling/recommendations and nutrition counseling/recommendations  Improving-- continues with diet and exercise

## 2024-07-01 ENCOUNTER — TELEPHONE (OUTPATIENT)
Dept: INTERNAL MEDICINE | Facility: CLINIC | Age: 34
End: 2024-07-01
Payer: COMMERCIAL

## 2024-07-01 NOTE — TELEPHONE ENCOUNTER
----- Message from [employer] sent at 6/29/2024  5:58 AM EDT -----  Regarding: Appointment Request ()  Contact: [phone]  Appointment Request From: Joaquim Solano    With Provider: Lynn Gr [-Primary Care Physician-]    Preferred Date Range: Any date 6/29/2024 or later    Preferred Times: Any    Reason: To address the following health maintenance concerns.  Annual Physical    Comments:  My left leg is dragging and going completely numb when I run, I can barely jog and I’m nervous.     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

## 2024-12-05 DIAGNOSIS — I10 PRIMARY HYPERTENSION: Chronic | ICD-10-CM

## 2024-12-05 RX ORDER — AMLODIPINE AND OLMESARTAN MEDOXOMIL 5; 20 MG/1; MG/1
1 TABLET ORAL DAILY
Qty: 90 TABLET | Refills: 1 | OUTPATIENT
Start: 2024-12-05

## 2024-12-05 NOTE — TELEPHONE ENCOUNTER
"Relay     \"You will need to reschedule your appointment from 11/1/24 that you missed, and then we can approve this for a refill.\"  "

## 2024-12-16 DIAGNOSIS — I10 PRIMARY HYPERTENSION: Chronic | ICD-10-CM

## 2024-12-16 RX ORDER — AMLODIPINE AND OLMESARTAN MEDOXOMIL 5; 20 MG/1; MG/1
1 TABLET ORAL DAILY
Qty: 90 TABLET | Refills: 1 | Status: CANCELLED | OUTPATIENT
Start: 2024-12-16

## 2024-12-17 RX ORDER — AMLODIPINE AND OLMESARTAN MEDOXOMIL 5; 20 MG/1; MG/1
1 TABLET ORAL DAILY
Qty: 30 TABLET | Refills: 0 | Status: SHIPPED | OUTPATIENT
Start: 2024-12-17

## 2024-12-17 NOTE — TELEPHONE ENCOUNTER
Caller: Joaquim Solano    Relationship: Self    Best call back number:[phone]      Who are you requesting to speak with (clinical staff, provider,  specific staff member): CLINICAL      What was the call regarding: PATIENT HAS APPOINTMENT SCHEDULED 12-19-24 (FIRST AVAILABLE)    PLEASE SEND PRESCRIPTION FOR   amlodipine-olmesartan (KIANA) 5-20 MG per tablet     PATIENT IS OUT OF MEDICATION

## 2025-01-02 ENCOUNTER — OFFICE VISIT (OUTPATIENT)
Dept: INTERNAL MEDICINE | Facility: CLINIC | Age: 35
End: 2025-01-02
Payer: COMMERCIAL

## 2025-01-02 VITALS
BODY MASS INDEX: 29.62 KG/M2 | WEIGHT: 200 LBS | SYSTOLIC BLOOD PRESSURE: 120 MMHG | HEART RATE: 106 BPM | DIASTOLIC BLOOD PRESSURE: 70 MMHG | OXYGEN SATURATION: 98 % | HEIGHT: 69 IN

## 2025-01-02 DIAGNOSIS — I10 PRIMARY HYPERTENSION: Chronic | ICD-10-CM

## 2025-01-02 DIAGNOSIS — R20.2 NUMBNESS AND TINGLING OF LEFT LEG: Chronic | ICD-10-CM

## 2025-01-02 DIAGNOSIS — F51.01 PRIMARY INSOMNIA: Chronic | ICD-10-CM

## 2025-01-02 DIAGNOSIS — Z23 NEED FOR INFLUENZA VACCINATION: ICD-10-CM

## 2025-01-02 DIAGNOSIS — Z00.00 ANNUAL PHYSICAL EXAM: Primary | ICD-10-CM

## 2025-01-02 DIAGNOSIS — F90.2 ATTENTION DEFICIT HYPERACTIVITY DISORDER (ADHD), COMBINED TYPE: Chronic | ICD-10-CM

## 2025-01-02 DIAGNOSIS — R20.0 NUMBNESS AND TINGLING OF LEFT LEG: Chronic | ICD-10-CM

## 2025-01-02 PROBLEM — M62.838 MUSCLE SPASM OF RIGHT LEG: Status: RESOLVED | Noted: 2023-03-28 | Resolved: 2025-01-02

## 2025-01-02 LAB
ALBUMIN SERPL-MCNC: 4.7 G/DL (ref 3.5–5.2)
ALBUMIN/GLOB SERPL: 1.5 G/DL
ALP SERPL-CCNC: 77 U/L (ref 39–117)
ALT SERPL-CCNC: 51 U/L (ref 1–41)
AST SERPL-CCNC: 25 U/L (ref 1–40)
BASOPHILS # BLD AUTO: 0.04 10*3/MM3 (ref 0–0.2)
BASOPHILS NFR BLD AUTO: 0.4 % (ref 0–1.5)
BILIRUB SERPL-MCNC: 0.5 MG/DL (ref 0–1.2)
BUN SERPL-MCNC: 11 MG/DL (ref 6–20)
BUN/CREAT SERPL: 13.4 (ref 7–25)
CALCIUM SERPL-MCNC: 9.9 MG/DL (ref 8.6–10.5)
CHLORIDE SERPL-SCNC: 102 MMOL/L (ref 98–107)
CHOLEST SERPL-MCNC: 235 MG/DL (ref 0–200)
CO2 SERPL-SCNC: 29.8 MMOL/L (ref 22–29)
CREAT SERPL-MCNC: 0.82 MG/DL (ref 0.76–1.27)
EGFRCR SERPLBLD CKD-EPI 2021: 116.8 ML/MIN/1.73
EOSINOPHIL # BLD AUTO: 0.05 10*3/MM3 (ref 0–0.4)
EOSINOPHIL NFR BLD AUTO: 0.5 % (ref 0.3–6.2)
ERYTHROCYTE [DISTWIDTH] IN BLOOD BY AUTOMATED COUNT: 12.6 % (ref 12.3–15.4)
GLOBULIN SER CALC-MCNC: 3.2 GM/DL
GLUCOSE SERPL-MCNC: 102 MG/DL (ref 65–99)
HBA1C MFR BLD: 5 % (ref 4.8–5.6)
HCT VFR BLD AUTO: 44.8 % (ref 37.5–51)
HDLC SERPL-MCNC: 70 MG/DL (ref 40–60)
HGB BLD-MCNC: 15.4 G/DL (ref 13–17.7)
IMM GRANULOCYTES # BLD AUTO: 0.08 10*3/MM3 (ref 0–0.05)
IMM GRANULOCYTES NFR BLD AUTO: 0.8 % (ref 0–0.5)
LDLC SERPL CALC-MCNC: 153 MG/DL (ref 0–100)
LYMPHOCYTES # BLD AUTO: 2.32 10*3/MM3 (ref 0.7–3.1)
LYMPHOCYTES NFR BLD AUTO: 24.4 % (ref 19.6–45.3)
MAGNESIUM SERPL-MCNC: 2 MG/DL (ref 1.6–2.6)
MCH RBC QN AUTO: 32.8 PG (ref 26.6–33)
MCHC RBC AUTO-ENTMCNC: 34.4 G/DL (ref 31.5–35.7)
MCV RBC AUTO: 95.5 FL (ref 79–97)
MONOCYTES # BLD AUTO: 0.69 10*3/MM3 (ref 0.1–0.9)
MONOCYTES NFR BLD AUTO: 7.3 % (ref 5–12)
NEUTROPHILS # BLD AUTO: 6.31 10*3/MM3 (ref 1.7–7)
NEUTROPHILS NFR BLD AUTO: 66.6 % (ref 42.7–76)
NRBC BLD AUTO-RTO: 0 /100 WBC (ref 0–0.2)
PLATELET # BLD AUTO: 224 10*3/MM3 (ref 140–450)
POTASSIUM SERPL-SCNC: 4.6 MMOL/L (ref 3.5–5.2)
PROT SERPL-MCNC: 7.9 G/DL (ref 6–8.5)
PSA SERPL-MCNC: 0.94 NG/ML (ref 0–4)
RBC # BLD AUTO: 4.69 10*6/MM3 (ref 4.14–5.8)
SODIUM SERPL-SCNC: 138 MMOL/L (ref 136–145)
TRIGL SERPL-MCNC: 73 MG/DL (ref 0–150)
TSH SERPL DL<=0.005 MIU/L-ACNC: 2.69 UIU/ML (ref 0.27–4.2)
VLDLC SERPL CALC-MCNC: 12 MG/DL (ref 5–40)
WBC # BLD AUTO: 9.49 10*3/MM3 (ref 3.4–10.8)

## 2025-01-02 PROCEDURE — 90656 IIV3 VACC NO PRSV 0.5 ML IM: CPT | Performed by: NURSE PRACTITIONER

## 2025-01-02 PROCEDURE — 90471 IMMUNIZATION ADMIN: CPT | Performed by: NURSE PRACTITIONER

## 2025-01-02 PROCEDURE — 99395 PREV VISIT EST AGE 18-39: CPT | Performed by: NURSE PRACTITIONER

## 2025-01-02 PROCEDURE — 99214 OFFICE O/P EST MOD 30 MIN: CPT | Performed by: NURSE PRACTITIONER

## 2025-01-02 RX ORDER — AMLODIPINE AND OLMESARTAN MEDOXOMIL 5; 20 MG/1; MG/1
1 TABLET ORAL DAILY
Qty: 90 TABLET | Refills: 1 | Status: SHIPPED | OUTPATIENT
Start: 2025-01-02

## 2025-01-02 NOTE — ASSESSMENT & PLAN NOTE
Current recommendations according to the current Physical Activity Guidelines for Americans: adults need 150-300 minutes of physical exercise weekly. It is also recommended to perform two sessions of full body strength training exercise weekly which includes all major muscle groups including legs, hips, back, abdomen, chest, shoulders, and arms.   Current CDC recommendations for diet include following a diet that emphasizes fruits, vegetables, whole grains that is low in saturated fats and low in sugar intake.   Adults should consume at least 3 cup equivalents of fruit and vegetables daily. It is also beneficial to get 25 grams of fiber daily unless told otherwise by your healthcare provider.   Labs today  Recommended self testicular examinations monthly  Anticipatory guidance given regarding health prevention/wellness, diet/exercise, tobacco/alcohol/drug education, exercise and wellbeing, covid 19 guidance, vaccination recommendations, and sexual health/STD education.   Recommended bi-yearly dental exams and regular vision examinations.

## 2025-01-02 NOTE — PROGRESS NOTES
"Chief Complaint  Annual Exam    Subjective        Joaquim Solano presents to [facility]  History of Present Illness  This is a 33 y/o female presenting to office for CPE and complaints of left leg numbness.     Denies tobacco use.     Reports he has noticed more numbness/tingling/spasms in his left leg. Reports he notices this only during coitus. He reports occasionally his left foot will go numb when sitting on toilet for prolonged periods of time due to his positioning. Denies any urinary symptoms; reports he he has noticed more leg shaking/spasms during orgasm when he is having sexual intercourse with his wife. Reports he does not have any issues with spasms outside of this activity. Denies any current low back pain or prev hx of back injury. Denies any urinary issues; denies any incontinence. Reports normal bowel movements. He does report drinking around 400mg of caffeine daily along with his ADHD stimulant. He reports sometimes he is poor at hydrating. Denies any difficulty with ED or maintaining erection. Denies any difficulty achieving orgasm.     Reports trying to follow active lifestyle; struggling with diet at choices.   Continues on haroon for HTN; denies CP or SOA.     Following with psychiatry--     UTD with dental exam.     Objective   Vital Signs:  /70 (BP Location: Left arm, Patient Position: Sitting, Cuff Size: Adult)   Pulse 106   Ht 175.3 cm (69\")   Wt 90.7 kg (200 lb)   SpO2 98%   BMI 29.53 kg/m²   Estimated body mass index is 29.53 kg/m² as calculated from the following:    Height as of this encounter: 175.3 cm (69\").    Weight as of this encounter: 90.7 kg (200 lb).            Physical Exam  Constitutional:       General: He is awake.      Appearance: Normal appearance.   HENT:      Head: Normocephalic and atraumatic.      Right Ear: Hearing, tympanic membrane, ear canal and external ear normal.      Left Ear: Hearing, tympanic membrane, ear canal and " external ear normal.      Nose: Nose normal.      Mouth/Throat:      Lips: Pink.      Mouth: Mucous membranes are moist.      Pharynx: Oropharynx is clear.   Eyes:      Extraocular Movements: Extraocular movements intact.      Conjunctiva/sclera: Conjunctivae normal.      Pupils: Pupils are equal, round, and reactive to light.   Neck:      Vascular: No carotid bruit.   Cardiovascular:      Rate and Rhythm: Normal rate and regular rhythm.      Pulses: Normal pulses.      Heart sounds: Normal heart sounds. No murmur heard.     No gallop.   Pulmonary:      Effort: Pulmonary effort is normal. No respiratory distress.      Breath sounds: Normal breath sounds. No wheezing or rales.   Abdominal:      General: Abdomen is flat. There is no distension.      Palpations: Abdomen is soft.      Tenderness: There is no abdominal tenderness.   Musculoskeletal:      Cervical back: Normal range of motion and neck supple.   Skin:     General: Skin is warm and dry.      Capillary Refill: Capillary refill takes less than 2 seconds.   Neurological:      General: No focal deficit present.      Mental Status: He is alert and oriented to person, place, and time. Mental status is at baseline.      Motor: Motor function is intact.      Coordination: Coordination is intact.      Gait: Gait is intact.      Deep Tendon Reflexes: Reflexes are normal and symmetric.   Psychiatric:         Attention and Perception: Attention normal.         Mood and Affect: Mood normal.         Speech: Speech normal.         Behavior: Behavior normal. Behavior is cooperative.         Thought Content: Thought content normal.         Cognition and Memory: Cognition normal.         Judgment: Judgment normal.        Result Review :  The following data was reviewed by: IRIS Nieves on 01/02/2025:    Tobacco Use: High Risk (1/2/2025)    Patient History     Smoking Tobacco Use: Never     Smokeless Tobacco Use: Current     Passive Exposure: Not on file     Social  History     Substance and Sexual Activity   Alcohol Use Not Currently    Comment: On the occasion there is an art/street fair     Family History   Problem Relation Age of Onset    Cancer Mother         Non-Hodgkins Lymphoma    Hyperlipidemia Mother     Hyperlipidemia Father     Heart attack Father                Assessment and Plan   Diagnoses and all orders for this visit:    1. Annual physical exam (Primary)  Assessment & Plan:  Current recommendations according to the current Physical Activity Guidelines for Americans: adults need 150-300 minutes of physical exercise weekly. It is also recommended to perform two sessions of full body strength training exercise weekly which includes all major muscle groups including legs, hips, back, abdomen, chest, shoulders, and arms.   Current CDC recommendations for diet include following a diet that emphasizes fruits, vegetables, whole grains that is low in saturated fats and low in sugar intake.   Adults should consume at least 3 cup equivalents of fruit and vegetables daily. It is also beneficial to get 25 grams of fiber daily unless told otherwise by your healthcare provider.   Labs today  Recommended self testicular examinations monthly  Anticipatory guidance given regarding health prevention/wellness, diet/exercise, tobacco/alcohol/drug education, exercise and wellbeing, covid 19 guidance, vaccination recommendations, and sexual health/STD education.   Recommended bi-yearly dental exams and regular vision examinations.       Orders:  -     Cancel: CBC & Differential  -     Cancel: Comprehensive Metabolic Panel  -     Cancel: Hemoglobin A1c  -     Cancel: TSH Rfx On Abnormal To Free T4  -     Cancel: Lipid Panel  -     TSH Rfx On Abnormal To Free T4  -     Lipid Panel  -     Hemoglobin A1c  -     Comprehensive Metabolic Panel  -     CBC & Differential    2. Primary hypertension  Assessment & Plan:  Hypertension is stable and controlled  Continue current treatment  regimen.  Dietary sodium restriction.  Regular aerobic exercise.  Blood pressure will be reassessed in 6 months.    Orders:  -     amlodipine-olmesartan (KIANA) 5-20 MG per tablet; Take 1 tablet by mouth Daily.  Dispense: 90 tablet; Refill: 1    3. Attention deficit hyperactivity disorder (ADHD), combined type  Assessment & Plan:  Continues on adderall per psychiatry  Follows with Dr. Rao      4. Primary insomnia  Assessment & Plan:  Cont on trazodone QHS PRN  Per psychiatry      5. Numbness and tingling of left leg  Assessment & Plan:  XR today   Recommended to limit caffeine and to increase water hydration  Labs today     Orders:  -     Magnesium  -     XR spine lumbar 4+ vw; Future  -     PSA DIAGNOSTIC             Follow Up   Return in about 6 months (around 7/2/2025) for Recheck chronic conditions.  Patient was given instructions and counseling regarding his condition or for health maintenance advice. Please see specific information pulled into the AVS if appropriate.

## 2025-03-25 ENCOUNTER — TELEPHONE (OUTPATIENT)
Dept: INTERNAL MEDICINE | Facility: CLINIC | Age: 35
End: 2025-03-25
Payer: COMMERCIAL

## 2025-03-25 NOTE — TELEPHONE ENCOUNTER
"Relay     \"You are overdue to have the x-ray of you spine done that was ordered in January for numbness/tingling of your leg? Are you still having this issue? Do you still plan to have the x-ray done? If not, we can cancel.\"  "
Statement Selected

## 2025-07-18 ENCOUNTER — TELEPHONE (OUTPATIENT)
Dept: INTERNAL MEDICINE | Facility: CLINIC | Age: 35
End: 2025-07-18
Payer: COMMERCIAL

## 2025-07-18 NOTE — TELEPHONE ENCOUNTER
"Relay     \"You are overdue to have an x-ray of your spine that was ordered at the beginning of the year due to numbness and tingling in your left leg. Are you still having this issue? Do you still plan to complete this x-ray?\"  "

## 2025-08-04 DIAGNOSIS — I10 PRIMARY HYPERTENSION: Chronic | ICD-10-CM

## 2025-08-04 RX ORDER — AMLODIPINE AND OLMESARTAN MEDOXOMIL 5; 20 MG/1; MG/1
1 TABLET ORAL DAILY
Qty: 90 TABLET | Refills: 1 | OUTPATIENT
Start: 2025-08-04

## 2025-08-07 DIAGNOSIS — I10 PRIMARY HYPERTENSION: Chronic | ICD-10-CM

## 2025-08-07 RX ORDER — AMLODIPINE AND OLMESARTAN MEDOXOMIL 5; 20 MG/1; MG/1
1 TABLET ORAL DAILY
Qty: 90 TABLET | Refills: 0 | Status: SHIPPED | OUTPATIENT
Start: 2025-08-07